# Patient Record
Sex: MALE | Race: WHITE
[De-identification: names, ages, dates, MRNs, and addresses within clinical notes are randomized per-mention and may not be internally consistent; named-entity substitution may affect disease eponyms.]

---

## 2020-11-05 ENCOUNTER — HOSPITAL ENCOUNTER (OUTPATIENT)
Dept: HOSPITAL 47 - ORWHC2ENDO | Age: 70
End: 2020-11-05
Attending: INTERNAL MEDICINE
Payer: MEDICAID

## 2020-11-05 VITALS — BODY MASS INDEX: 28.7 KG/M2

## 2020-11-05 VITALS — SYSTOLIC BLOOD PRESSURE: 158 MMHG | RESPIRATION RATE: 18 BRPM | HEART RATE: 66 BPM | DIASTOLIC BLOOD PRESSURE: 89 MMHG

## 2020-11-05 VITALS — TEMPERATURE: 97.4 F

## 2020-11-05 DIAGNOSIS — Z91.018: ICD-10-CM

## 2020-11-05 DIAGNOSIS — K50.90: ICD-10-CM

## 2020-11-05 DIAGNOSIS — Z88.8: ICD-10-CM

## 2020-11-05 DIAGNOSIS — Z90.89: ICD-10-CM

## 2020-11-05 DIAGNOSIS — K57.30: ICD-10-CM

## 2020-11-05 DIAGNOSIS — I48.91: ICD-10-CM

## 2020-11-05 DIAGNOSIS — Z98.890: ICD-10-CM

## 2020-11-05 DIAGNOSIS — Z88.2: ICD-10-CM

## 2020-11-05 DIAGNOSIS — Z79.899: ICD-10-CM

## 2020-11-05 DIAGNOSIS — Z91.040: ICD-10-CM

## 2020-11-05 DIAGNOSIS — Z12.11: Primary | ICD-10-CM

## 2020-11-05 PROCEDURE — 88305 TISSUE EXAM BY PATHOLOGIST: CPT

## 2020-11-05 PROCEDURE — 45380 COLONOSCOPY AND BIOPSY: CPT

## 2020-11-05 NOTE — P.PCN
Date of Procedure: 11/05/20


Procedure(s) Performed: 


BRIEF HISTORY: Patient is a 70-year-old pleasant male scheduled for an elective 

colonoscopy as a part of surveillance of long-standing history of Crohn's 

disease.  He remains in clinical remission





PROCEDURE PERFORMED: Colonoscopy with random biopsies. 





PREOPERATIVE DIAGNOSIS: Long-standing history of Crohn's disease. 





IV sedation per Anesthesia. 





PROCEDURE: After informed consent was obtained, the patient, was brought into 

the endoscopy unit. IV sedation was administered by Anesthesia under continuous 

monitoring.  Digital rectal examination was normal. Initially the Olympus CF-160

flexible video colonoscope was then inserted in the rectum, gradually advanced 

into the cecum without any difficulty. Careful examination was performed as the 

scope was gradually being withdrawn. Ileocecal valve and the appendiceal orifice

were visualized and appeared normal.  Prep was excellent. Mucosa of the cecum, 

ascending colon, transverse colon, descending colon, sigmoid colon, and rectum 

appeared normal.  Random biopsies were done.  Scattered sigmoid diverticulosis 

seen.  Retroflexion was performed in the rectum and no lesions were seen. The 

patient tolerated the procedure well. 





IMPRESSION:


Normal-appearing colon from rectum to cecum with no evidence of colitis or 

colorectal neoplasia .


Scattered left-sided diverticulosis.





RECOMMENDATIONS:  Findings of this examination were discussed with the patient 

his family.  He was advised to follow with the biopsy results.  If there was no 

evidence of dysplasia he can have a repeat colonoscopy in 5 years.

## 2021-10-21 ENCOUNTER — HOSPITAL ENCOUNTER (EMERGENCY)
Dept: HOSPITAL 47 - EC | Age: 71
Discharge: HOME | End: 2021-10-21
Payer: MEDICAID

## 2021-10-21 VITALS — SYSTOLIC BLOOD PRESSURE: 127 MMHG | DIASTOLIC BLOOD PRESSURE: 82 MMHG

## 2021-10-21 VITALS — TEMPERATURE: 98.2 F | RESPIRATION RATE: 18 BRPM

## 2021-10-21 VITALS — HEART RATE: 72 BPM

## 2021-10-21 DIAGNOSIS — Z88.1: ICD-10-CM

## 2021-10-21 DIAGNOSIS — Z90.89: ICD-10-CM

## 2021-10-21 DIAGNOSIS — Z87.442: ICD-10-CM

## 2021-10-21 DIAGNOSIS — I48.91: ICD-10-CM

## 2021-10-21 DIAGNOSIS — I47.1: Primary | ICD-10-CM

## 2021-10-21 LAB
ALBUMIN SERPL-MCNC: 3.9 G/DL (ref 3.5–5)
ALP SERPL-CCNC: 109 U/L (ref 38–126)
ALT SERPL-CCNC: 32 U/L (ref 4–49)
ANION GAP SERPL CALC-SCNC: 7 MMOL/L
APTT BLD: 22.9 SEC (ref 22–30)
AST SERPL-CCNC: 31 U/L (ref 17–59)
BASOPHILS # BLD AUTO: 0.1 K/UL (ref 0–0.2)
BASOPHILS NFR BLD AUTO: 1 %
BUN SERPL-SCNC: 17 MG/DL (ref 9–20)
CALCIUM SPEC-MCNC: 9.5 MG/DL (ref 8.4–10.2)
CHLORIDE SERPL-SCNC: 106 MMOL/L (ref 98–107)
CO2 SERPL-SCNC: 21 MMOL/L (ref 22–30)
EOSINOPHIL # BLD AUTO: 0.4 K/UL (ref 0–0.7)
EOSINOPHIL NFR BLD AUTO: 4 %
ERYTHROCYTE [DISTWIDTH] IN BLOOD BY AUTOMATED COUNT: 4.77 M/UL (ref 4.3–5.9)
ERYTHROCYTE [DISTWIDTH] IN BLOOD: 12.5 % (ref 11.5–15.5)
GLUCOSE SERPL-MCNC: 114 MG/DL (ref 74–99)
HCT VFR BLD AUTO: 42 % (ref 39–53)
HGB BLD-MCNC: 14 GM/DL (ref 13–17.5)
INR PPP: 0.9 (ref ?–1.2)
LIPASE SERPL-CCNC: 340 U/L (ref 23–300)
LYMPHOCYTES # SPEC AUTO: 2.8 K/UL (ref 1–4.8)
LYMPHOCYTES NFR SPEC AUTO: 28 %
MAGNESIUM SPEC-SCNC: 2 MG/DL (ref 1.6–2.3)
MCH RBC QN AUTO: 29.4 PG (ref 25–35)
MCHC RBC AUTO-ENTMCNC: 33.4 G/DL (ref 31–37)
MCV RBC AUTO: 88.2 FL (ref 80–100)
MONOCYTES # BLD AUTO: 1.2 K/UL (ref 0–1)
MONOCYTES NFR BLD AUTO: 13 %
NEUTROPHILS # BLD AUTO: 4.9 K/UL (ref 1.3–7.7)
NEUTROPHILS NFR BLD AUTO: 51 %
PLATELET # BLD AUTO: 307 K/UL (ref 150–450)
POTASSIUM SERPL-SCNC: 4.4 MMOL/L (ref 3.5–5.1)
PROT SERPL-MCNC: 6.7 G/DL (ref 6.3–8.2)
PT BLD: 10.1 SEC (ref 9–12)
SODIUM SERPL-SCNC: 134 MMOL/L (ref 137–145)
WBC # BLD AUTO: 9.7 K/UL (ref 3.8–10.6)

## 2021-10-21 PROCEDURE — 85379 FIBRIN DEGRADATION QUANT: CPT

## 2021-10-21 PROCEDURE — 71046 X-RAY EXAM CHEST 2 VIEWS: CPT

## 2021-10-21 PROCEDURE — 83735 ASSAY OF MAGNESIUM: CPT

## 2021-10-21 PROCEDURE — 99285 EMERGENCY DEPT VISIT HI MDM: CPT

## 2021-10-21 PROCEDURE — 83690 ASSAY OF LIPASE: CPT

## 2021-10-21 PROCEDURE — 36415 COLL VENOUS BLD VENIPUNCTURE: CPT

## 2021-10-21 PROCEDURE — 85730 THROMBOPLASTIN TIME PARTIAL: CPT

## 2021-10-21 PROCEDURE — 80053 COMPREHEN METABOLIC PANEL: CPT

## 2021-10-21 PROCEDURE — 85610 PROTHROMBIN TIME: CPT

## 2021-10-21 PROCEDURE — 93005 ELECTROCARDIOGRAM TRACING: CPT

## 2021-10-21 PROCEDURE — 85025 COMPLETE CBC W/AUTO DIFF WBC: CPT

## 2021-10-21 PROCEDURE — 83880 ASSAY OF NATRIURETIC PEPTIDE: CPT

## 2021-10-21 PROCEDURE — 84484 ASSAY OF TROPONIN QUANT: CPT

## 2021-10-21 NOTE — XR
EXAMINATION TYPE: XR chest 2V

 

DATE OF EXAM: 10/21/2021

 

COMPARISON: 2/10/2015

 

HISTORY: Chest pain

 

TECHNIQUE: 2 views

 

FINDINGS: There is no heart failure nor confluent pneumonic infiltrate. Costophrenic angles are clear
. There are chest leads. Bony thorax is intact.

 

IMPRESSION: No active cardiopulmonary disease. Normal heart. No adverse change.

## 2021-10-21 NOTE — ED
Chest Pain HPI





- General


Chief Complaint: Chest Pain


Stated Complaint: Chest Pain


Time Seen by Provider: 10/21/21 03:15


Source: patient, EMS


Mode of arrival: EMS


Limitations: no limitations





- Related Data


                                Home Medications











 Medication  Instructions  Recorded  Confirmed


 


Verapamil Sr [Isoptin Sr] 180 mg PO BID 09/06/16 11/03/20


 


Vitamin B Complex 1 each PO DAILY 11/03/20 11/03/20











                                    Allergies











Allergy/AdvReac Type Severity Reaction Status Date / Time


 


Sulfa (Sulfonamide Allergy Severe Anaphylaxis Verified 10/21/21 03:12





Antibiotics)     


 


sulfasalazine Allergy Severe Anaphylaxis Verified 10/21/21 03:12





[From Azulfidine]     


 


cortisone [Cortisone] Allergy  Unknown Verified 10/21/21 03:12


 


Pork/Porcine Containing AdvReac Severe Nausea Verified 10/21/21 03:12





Products     





[Pork]     














Review of Systems


ROS Statement: 


Those systems with pertinent positive or pertinent negative responses have been 

documented in the HPI.





ROS Other: All systems not noted in ROS Statement are negative.





EKG Findings





- EKG Comments:


EKG Findings:: EKG shows rate of 81  QRS 80 





Past Medical History


Past Medical History: Atrial Fibrillation


Additional Past Medical History / Comment(s): crohn's, arrhythmia, tachycardia, 

hx kidney stone, left eye astigmatism, "elevated white count", hiatal hernia, 

gout, SVT


History of Any Multi-Drug Resistant Organisms: None Reported


Past Surgical History: Back Surgery, Orthopedic Surgery, Tonsillectomy


Additional Past Surgical History / Comment(s): laminectomy, brain-subdural 

hematoma, left knee surgery, left shoulder x 2(rotator cuff, ligament repair), 

golden cataracts, surgery for twisted bowel


Past Anesthesia/Blood Transfusion Reactions: Previous Problems w/ Anesthesia


Additional Past Anesthesia/Blood Transfusion Reaction / Comment(s): "arrhythmia"

  one time after shoulder surgery


Past Psychological History: No Psychological Hx Reported


Smoking Status: Never smoker


Past Alcohol Use History: Occasional


Past Drug Use History: None Reported





- Past Family History


  ** Mother


Family Medical History: No Reported History





General Exam


Limitations: no limitations





Course


                                   Vital Signs











  10/21/21 10/21/21





  03:08 05:12


 


Temperature 98.2 F 


 


Pulse Rate 80 74


 


Respiratory 18 18





Rate  


 


Blood Pressure 151/95 127/82


 


O2 Sat by Pulse 98 98





Oximetry  














Disposition


Clinical Impression: 


 SVT (supraventricular tachycardia)





Disposition: HOME SELF-CARE


Condition: Undetermined


Instructions (If sedation given, give patient instructions):  Supraventricular 

Tachycardia (ED)


Is patient prescribed a controlled substance at d/c from ED?: No


Referrals: 


None,Stated [Primary Care Provider] - 1-2 days